# Patient Record
Sex: MALE | Race: ASIAN | NOT HISPANIC OR LATINO
[De-identification: names, ages, dates, MRNs, and addresses within clinical notes are randomized per-mention and may not be internally consistent; named-entity substitution may affect disease eponyms.]

---

## 2022-12-16 ENCOUNTER — NON-APPOINTMENT (OUTPATIENT)
Age: 47
End: 2022-12-16

## 2022-12-16 ENCOUNTER — APPOINTMENT (OUTPATIENT)
Dept: CARDIOLOGY | Facility: CLINIC | Age: 47
End: 2022-12-16

## 2022-12-16 VITALS
HEIGHT: 64 IN | DIASTOLIC BLOOD PRESSURE: 92 MMHG | TEMPERATURE: 96.8 F | WEIGHT: 128 LBS | OXYGEN SATURATION: 98 % | SYSTOLIC BLOOD PRESSURE: 140 MMHG | RESPIRATION RATE: 16 BRPM | HEART RATE: 127 BPM | BODY MASS INDEX: 21.85 KG/M2

## 2022-12-16 DIAGNOSIS — J45.909 UNSPECIFIED ASTHMA, UNCOMPLICATED: ICD-10-CM

## 2022-12-16 DIAGNOSIS — Z82.49 FAMILY HISTORY OF ISCHEMIC HEART DISEASE AND OTHER DISEASES OF THE CIRCULATORY SYSTEM: ICD-10-CM

## 2022-12-16 DIAGNOSIS — Z85.038 PERSONAL HISTORY OF OTHER MALIGNANT NEOPLASM OF LARGE INTESTINE: ICD-10-CM

## 2022-12-16 DIAGNOSIS — Z78.9 OTHER SPECIFIED HEALTH STATUS: ICD-10-CM

## 2022-12-16 DIAGNOSIS — D49.0 NEOPLASM OF UNSPECIFIED BEHAVIOR OF DIGESTIVE SYSTEM: ICD-10-CM

## 2022-12-16 PROBLEM — Z00.00 ENCOUNTER FOR PREVENTIVE HEALTH EXAMINATION: Status: ACTIVE | Noted: 2022-12-16

## 2022-12-16 PROCEDURE — 99204 OFFICE O/P NEW MOD 45 MIN: CPT | Mod: 25

## 2022-12-16 PROCEDURE — 93000 ELECTROCARDIOGRAM COMPLETE: CPT

## 2022-12-16 RX ORDER — ATENOLOL 25 MG/1
25 TABLET ORAL TWICE DAILY
Qty: 60 | Refills: 5 | Status: ACTIVE | COMMUNITY
Start: 2022-12-16 | End: 1900-01-01

## 2022-12-23 PROBLEM — J45.909 ASTHMA: Status: ACTIVE | Noted: 2022-12-23

## 2022-12-23 NOTE — HISTORY OF PRESENT ILLNESS
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 On 11/17/2022, pt had colon surgery and since then he started to have tachycardia, heart rate up to 100s.  He was started on Metoprolol ER 50 mg.  After one week, he had to increase the dose to 100 mg due to uncontrolled heart rate.  Then, he started to have itchy rash.  He didn't take Metoprolol today because he couldn't fall asleep last night due to itchiness.  He reports palpitations and weakness. He denies CP. He denies SOB. Pt denies h/o syncope.  I advised patient to undergo an echocardiogram.  I will obtain insurance authorization ( palpitations, tachycardia, possible LV dysfunction).\par

## 2023-01-16 NOTE — REASON FOR VISIT
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  \par \par Patient was last seen on 12/16/22 for evaluation of tachycardia.  He already had an echo with Dr. Hema Enamorado in Nov 2022 and it showed normal LV function.  I advised patient to start Atenolol 25 mg BID.\par \par

## 2023-01-16 NOTE — HISTORY OF PRESENT ILLNESS
[FreeTextEntry1] : \par \par 12/16/22 - On 11/17/2022, pt had colon surgery and since then he started to have tachycardia, heart rate up to 100s.  He was started on Metoprolol ER 50 mg.  After one week, he had to increase the dose to 100 mg due to uncontrolled heart rate.  Then, he started to have itchy rash.  He didn't take Metoprolol today because he couldn't fall asleep last night due to itchiness.  He reports palpitations and weakness. He denies CP. He denies SOB. Pt denies h/o syncope.  I advised patient to undergo an echocardiogram.  I will obtain insurance authorization ( palpitations, tachycardia, possible LV dysfunction).  I advised patient to start Atenolol 25 mg BID.\par  Private car

## 2023-01-17 ENCOUNTER — APPOINTMENT (OUTPATIENT)
Dept: CARDIOLOGY | Facility: CLINIC | Age: 48
End: 2023-01-17
Payer: MEDICAID

## 2023-01-17 VITALS
OXYGEN SATURATION: 98 % | RESPIRATION RATE: 16 BRPM | SYSTOLIC BLOOD PRESSURE: 123 MMHG | HEART RATE: 86 BPM | DIASTOLIC BLOOD PRESSURE: 84 MMHG | WEIGHT: 131 LBS | TEMPERATURE: 97.7 F | BODY MASS INDEX: 22.49 KG/M2

## 2023-01-17 DIAGNOSIS — R03.0 ELEVATED BLOOD-PRESSURE READING, W/OUT DIAGNOSIS OF HYPERTENSION: ICD-10-CM

## 2023-01-17 DIAGNOSIS — R00.2 PALPITATIONS: ICD-10-CM

## 2023-01-17 PROCEDURE — 99213 OFFICE O/P EST LOW 20 MIN: CPT

## 2023-01-17 RX ORDER — BLOOD-GLUCOSE METER
KIT MISCELLANEOUS
Qty: 1 | Refills: 0 | Status: ACTIVE | COMMUNITY
Start: 2023-01-17 | End: 1900-01-01

## 2023-01-22 PROBLEM — R00.2 PALPITATIONS: Status: ACTIVE | Noted: 2022-12-23

## 2023-04-16 PROBLEM — R00.0 TACHYCARDIA: Status: ACTIVE | Noted: 2022-12-16

## 2023-04-17 ENCOUNTER — APPOINTMENT (OUTPATIENT)
Dept: CARDIOLOGY | Facility: CLINIC | Age: 48
End: 2023-04-17

## 2023-04-17 DIAGNOSIS — R00.0 TACHYCARDIA, UNSPECIFIED: ICD-10-CM

## 2023-04-17 NOTE — REASON FOR VISIT
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  \par \par Patient was last seen on 1/17/23 for followup.  Patient was stable.  He was on Atenolol 25 mg QD.  Patient reported decreased palpitations.   \par \par He is on Atenolol 25 mg for palpitations.  \par \par Patient underwent an echocardiogram 11/5/22 with Dr. Enamorado and it showed normal LV function without significant valvular pathology.

## 2023-04-17 NOTE — HISTORY OF PRESENT ILLNESS
[FreeTextEntry1] : 1/17/23 - Patient has been stable.  He is on Atenolol 25 mg QD.  Patient denies CP or SOB.  Patient reports decreased palpitations.  Patient denies lightheadedness.  \par \par 12/16/22 - On 11/17/2022, pt had colon surgery and since then he started to have tachycardia, heart rate up to 100s.  He was started on Metoprolol ER 50 mg.  After one week, he had to increase the dose to 100 mg due to uncontrolled heart rate.  Then, he started to have itchy rash.  He didn't take Metoprolol today because he couldn't fall asleep last night due to itchiness.  He reports palpitations and weakness. He denies CP. He denies SOB. Pt denies h/o syncope.  I advised patient to undergo an echocardiogram.  I will obtain insurance authorization ( palpitations, tachycardia, possible LV dysfunction).  I advised patient to start Atenolol 25 mg BID.\par

## 2024-12-25 PROBLEM — F10.90 ALCOHOL USE: Status: INACTIVE | Noted: 2022-12-16
